# Patient Record
Sex: FEMALE | Race: BLACK OR AFRICAN AMERICAN | NOT HISPANIC OR LATINO | Employment: FULL TIME | URBAN - METROPOLITAN AREA
[De-identification: names, ages, dates, MRNs, and addresses within clinical notes are randomized per-mention and may not be internally consistent; named-entity substitution may affect disease eponyms.]

---

## 2020-08-12 ENCOUNTER — HOSPITAL ENCOUNTER (EMERGENCY)
Facility: MEDICAL CENTER | Age: 40
End: 2020-08-12
Attending: EMERGENCY MEDICINE
Payer: COMMERCIAL

## 2020-08-12 ENCOUNTER — APPOINTMENT (OUTPATIENT)
Dept: RADIOLOGY | Facility: MEDICAL CENTER | Age: 40
End: 2020-08-12
Attending: EMERGENCY MEDICINE
Payer: COMMERCIAL

## 2020-08-12 VITALS
HEIGHT: 64 IN | HEART RATE: 106 BPM | SYSTOLIC BLOOD PRESSURE: 117 MMHG | OXYGEN SATURATION: 94 % | DIASTOLIC BLOOD PRESSURE: 70 MMHG | RESPIRATION RATE: 16 BRPM | WEIGHT: 139.99 LBS | TEMPERATURE: 97.2 F | BODY MASS INDEX: 23.9 KG/M2

## 2020-08-12 DIAGNOSIS — R00.0 SINUS TACHYCARDIA: ICD-10-CM

## 2020-08-12 DIAGNOSIS — S82.61XA CLOSED DISPLACED FRACTURE OF LATERAL MALLEOLUS OF RIGHT FIBULA, INITIAL ENCOUNTER: ICD-10-CM

## 2020-08-12 DIAGNOSIS — S93.04XA DISLOCATION OF RIGHT ANKLE JOINT, INITIAL ENCOUNTER: ICD-10-CM

## 2020-08-12 LAB
ALBUMIN SERPL BCP-MCNC: 4.1 G/DL (ref 3.2–4.9)
ALBUMIN/GLOB SERPL: 1.4 G/DL
ALP SERPL-CCNC: 75 U/L (ref 30–99)
ALT SERPL-CCNC: 10 U/L (ref 2–50)
ANION GAP SERPL CALC-SCNC: 15 MMOL/L (ref 7–16)
AST SERPL-CCNC: 15 U/L (ref 12–45)
BASOPHILS # BLD AUTO: 0.3 % (ref 0–1.8)
BASOPHILS # BLD: 0.04 K/UL (ref 0–0.12)
BILIRUB SERPL-MCNC: 0.2 MG/DL (ref 0.1–1.5)
BUN SERPL-MCNC: 9 MG/DL (ref 8–22)
CALCIUM SERPL-MCNC: 8.4 MG/DL (ref 8.5–10.5)
CHLORIDE SERPL-SCNC: 101 MMOL/L (ref 96–112)
CO2 SERPL-SCNC: 21 MMOL/L (ref 20–33)
CREAT SERPL-MCNC: 0.62 MG/DL (ref 0.5–1.4)
EKG IMPRESSION: NORMAL
EOSINOPHIL # BLD AUTO: 0.21 K/UL (ref 0–0.51)
EOSINOPHIL NFR BLD: 1.5 % (ref 0–6.9)
ERYTHROCYTE [DISTWIDTH] IN BLOOD BY AUTOMATED COUNT: 41.1 FL (ref 35.9–50)
GLOBULIN SER CALC-MCNC: 3 G/DL (ref 1.9–3.5)
GLUCOSE SERPL-MCNC: 147 MG/DL (ref 65–99)
HCT VFR BLD AUTO: 37.8 % (ref 37–47)
HGB BLD-MCNC: 12.1 G/DL (ref 12–16)
IMM GRANULOCYTES # BLD AUTO: 0.05 K/UL (ref 0–0.11)
IMM GRANULOCYTES NFR BLD AUTO: 0.4 % (ref 0–0.9)
LYMPHOCYTES # BLD AUTO: 2.17 K/UL (ref 1–4.8)
LYMPHOCYTES NFR BLD: 15.7 % (ref 22–41)
MCH RBC QN AUTO: 28.6 PG (ref 27–33)
MCHC RBC AUTO-ENTMCNC: 32 G/DL (ref 33.6–35)
MCV RBC AUTO: 89.4 FL (ref 81.4–97.8)
MONOCYTES # BLD AUTO: 1.02 K/UL (ref 0–0.85)
MONOCYTES NFR BLD AUTO: 7.4 % (ref 0–13.4)
NEUTROPHILS # BLD AUTO: 10.3 K/UL (ref 2–7.15)
NEUTROPHILS NFR BLD: 74.7 % (ref 44–72)
NRBC # BLD AUTO: 0 K/UL
NRBC BLD-RTO: 0 /100 WBC
PLATELET # BLD AUTO: 244 K/UL (ref 164–446)
PMV BLD AUTO: 10.9 FL (ref 9–12.9)
POTASSIUM SERPL-SCNC: 3.2 MMOL/L (ref 3.6–5.5)
PROT SERPL-MCNC: 7.1 G/DL (ref 6–8.2)
RBC # BLD AUTO: 4.23 M/UL (ref 4.2–5.4)
SODIUM SERPL-SCNC: 137 MMOL/L (ref 135–145)
TROPONIN T SERPL-MCNC: <6 NG/L (ref 6–19)
WBC # BLD AUTO: 13.8 K/UL (ref 4.8–10.8)

## 2020-08-12 PROCEDURE — 73600 X-RAY EXAM OF ANKLE: CPT | Mod: RT

## 2020-08-12 PROCEDURE — 27840 TREAT ANKLE DISLOCATION: CPT

## 2020-08-12 PROCEDURE — 700101 HCHG RX REV CODE 250: Performed by: EMERGENCY MEDICINE

## 2020-08-12 PROCEDURE — 80053 COMPREHEN METABOLIC PANEL: CPT

## 2020-08-12 PROCEDURE — 71045 X-RAY EXAM CHEST 1 VIEW: CPT

## 2020-08-12 PROCEDURE — 96374 THER/PROPH/DIAG INJ IV PUSH: CPT

## 2020-08-12 PROCEDURE — 302875 HCHG BANDAGE ACE 4 OR 6""

## 2020-08-12 PROCEDURE — 99285 EMERGENCY DEPT VISIT HI MDM: CPT

## 2020-08-12 PROCEDURE — 93005 ELECTROCARDIOGRAM TRACING: CPT | Performed by: EMERGENCY MEDICINE

## 2020-08-12 PROCEDURE — 85025 COMPLETE CBC W/AUTO DIFF WBC: CPT

## 2020-08-12 PROCEDURE — 94760 N-INVAS EAR/PLS OXIMETRY 1: CPT

## 2020-08-12 PROCEDURE — 96375 TX/PRO/DX INJ NEW DRUG ADDON: CPT

## 2020-08-12 PROCEDURE — 700111 HCHG RX REV CODE 636 W/ 250 OVERRIDE (IP): Performed by: EMERGENCY MEDICINE

## 2020-08-12 PROCEDURE — 73610 X-RAY EXAM OF ANKLE: CPT | Mod: RT

## 2020-08-12 PROCEDURE — 84484 ASSAY OF TROPONIN QUANT: CPT

## 2020-08-12 PROCEDURE — 29515 APPLICATION SHORT LEG SPLINT: CPT

## 2020-08-12 PROCEDURE — 700105 HCHG RX REV CODE 258: Performed by: EMERGENCY MEDICINE

## 2020-08-12 PROCEDURE — 302874 HCHG BANDAGE ACE 2 OR 3""

## 2020-08-12 PROCEDURE — 94770 HCHG CO2 EXPIRED GAS DETERMINATION: CPT

## 2020-08-12 RX ORDER — HYDROCODONE BITARTRATE AND ACETAMINOPHEN 5; 325 MG/1; MG/1
1 TABLET ORAL EVERY 4 HOURS PRN
Qty: 20 TAB | Refills: 0 | Status: SHIPPED | OUTPATIENT
Start: 2020-08-12 | End: 2020-08-17

## 2020-08-12 RX ORDER — KETAMINE HYDROCHLORIDE 50 MG/ML
1 INJECTION, SOLUTION INTRAMUSCULAR; INTRAVENOUS ONCE
Status: COMPLETED | OUTPATIENT
Start: 2020-08-12 | End: 2020-08-12

## 2020-08-12 RX ORDER — SODIUM CHLORIDE 9 MG/ML
500 INJECTION, SOLUTION INTRAVENOUS
Status: DISCONTINUED | OUTPATIENT
Start: 2020-08-12 | End: 2020-08-13 | Stop reason: HOSPADM

## 2020-08-12 RX ORDER — HYDROMORPHONE HYDROCHLORIDE 1 MG/ML
1 INJECTION, SOLUTION INTRAMUSCULAR; INTRAVENOUS; SUBCUTANEOUS ONCE
Status: COMPLETED | OUTPATIENT
Start: 2020-08-12 | End: 2020-08-12

## 2020-08-12 RX ORDER — ONDANSETRON 2 MG/ML
4 INJECTION INTRAMUSCULAR; INTRAVENOUS ONCE
Status: COMPLETED | OUTPATIENT
Start: 2020-08-12 | End: 2020-08-12

## 2020-08-12 RX ADMIN — KETAMINE HYDROCHLORIDE 25 MG: 10 INJECTION, SOLUTION INTRAMUSCULAR; INTRAVENOUS at 21:30

## 2020-08-12 RX ADMIN — HYDROMORPHONE HYDROCHLORIDE 1 MG: 1 INJECTION, SOLUTION INTRAMUSCULAR; INTRAVENOUS; SUBCUTANEOUS at 20:45

## 2020-08-12 RX ADMIN — SODIUM CHLORIDE 500 ML: 9 INJECTION, SOLUTION INTRAVENOUS at 21:43

## 2020-08-12 RX ADMIN — KETAMINE HYDROCHLORIDE 63.5 MG: 50 INJECTION INTRAMUSCULAR; INTRAVENOUS at 21:24

## 2020-08-12 RX ADMIN — ONDANSETRON 4 MG: 2 INJECTION INTRAMUSCULAR; INTRAVENOUS at 20:45

## 2020-08-13 NOTE — DISCHARGE INSTRUCTIONS
Use the crutches, do not put any weight on the splint or the ankle.  You may follow-up with Dr. De Paz here but if your member of [facility] please call them tomorrow to make arrangements for orthopedic follow-up.

## 2020-08-13 NOTE — ED PROVIDER NOTES
"ED Provider  Scribed for Tao George D.O. by Dagoberto Ramírez. 8/12/2020  8:16 PM    Means of arrival: EMS   History obtained from: patient  History limited by: none    CHIEF COMPLAINT  Chief Complaint   Patient presents with   • Ankle Pain     Right       PPE Note: I personally donned full PPE for all patient encounters during this visit, including being clean-shaven with an N95 respirator mask, gloves, and eye protection. Scribe remained outside the patient's room and did not have any contact with the patient for the duration of patient encounter.       HPI  Carlie Canas is a 40 y.o. female via EMS who presents with complaints of right ankle pain and deformity that acute onset shortly prior to arrival after she fell while she was roller skating. She reports that her pain is currently an 8/10. She states that her pain improved after being given Fentanyl 150 mcg en route by EMS. She denies any recent fever, chills, vomiting, or diarrhea.     REVIEW OF SYSTEMS  See HPI for further details. All other systems are negative.     PAST MEDICAL HISTORY  None noted when reviewed.     SOCIAL HISTORY  Social History     Tobacco Use   • Smoking status: None noted.   Substance and Sexual Activity   • Alcohol use: None noted.   • Drug use: None noted.       SURGICAL HISTORY  patient denies any surgical history    CURRENT MEDICATIONS  No current outpatient medications     ALLERGIES  Allergies   Allergen Reactions   • Flagyl [Kdc:Metronidazole+Tartrazine]    • Sulfa Drugs        PHYSICAL EXAM  VITAL SIGNS: /101   Pulse (!) 128   Temp 36.4 °C (97.5 °F) (Oral)   Resp 20   Ht 1.626 m (5' 4\")   Wt 63.5 kg (140 lb)   SpO2 99%   BMI 24.03 kg/m²   Constitutional: Alert in no apparent distress.  HENT: No signs of trauma, mucous membranes are moist  Eyes: Conjunctiva normal, Non-icteric.   Neck: Normal range of motion, No tenderness, Supple.  Lymphatic: No lymphadenopathy noted.   Cardiovascular: Tachycardic rate and regular " rhythm, no murmurs.   Thorax & Lungs: Normal breath sounds, No respiratory distress, No wheezing, No chest tenderness.   Abdomen: Bowel sounds normal, Soft, No tenderness, No masses, No pulsatile masses. No peritoneal signs.  Skin: Warm, Dry, normal color.   Back: No bony tenderness, No CVA tenderness.   Extremities: Right ankle has obvious deformity and external rotation. Pedal pulse is present. Distal sensation is intact.   Neurologic: Alert and oriented x4, Normal motor function, Normal sensory function, No focal deficits noted.   Psychiatric: Affect normal, Judgment normal, Mood normal.       DIAGNOSTIC STUDIES / PROCEDURES    RADIOLOGY  DX-ANKLE 2- VIEWS RIGHT   Final Result      1.  Improved alignment of the right tibiotalar joint status post reduction. Persistent medial displacement of the tibia relative to the talus.   2.  Distal fibular fracture is redemonstrated.   3.  Posterior malleolar fracture is now evident upon reduction.      DX-CHEST-PORTABLE (1 VIEW)   Final Result      No acute cardiopulmonary disease evident.      DX-ANKLE 3+ VIEWS RIGHT   Final Result      1.  Anteromedial subluxation of the tibiotalar joint with fracture of the medial malleolus resulting in disruption of the ankle mortise and syndesmosis.   2.  Diffuse soft tissue swelling.        The radiologist's interpretations of all radiological studies have been reviewed by me.    Films have been independently by me      Conscious Sedation Procedure Note    Indication: fracture dislocation    Consent: I have discussed with the patient and/or the patient representative the indication, alternatives, and the possible risks and/or complications of the planned procedure and the anesthesia methods. The patient and/or patient representative appear to understand and agree to proceed.    Physician Involvement: The attending physician was present and supervising this procedure.    Pre-Sedation Documentation and Exam: I have personally completed a  history, physical exam & review of systems for this patient (see notes).  Vital signs have been reviewed (see flow sheet for vitals).    Airway Assessment: Mallampati Class II - (soft palate, fauces & uvula are visible)    Prior History of Anesthesia Complications: none    ASA Classification: Class 1 - A normal healthy patient    Sedation/ Anesthesia Plan: intravenous sedation    Medications Used: ketamine intravenously    Monitoring and Safety: The patient was placed on a cardiac monitor and vital signs, pulse oximetry and level of consciousness were continuously evaluated throughout the procedure. The patient was closely monitored until recovery from the medications was complete and the patient had returned to baseline status. Respiratory therapy was on standby at all times during the procedure.    Post-Sedation Vital Signs: Vital signs were reviewed and were stable after the procedure (see flow sheet for vitals)            Intraservice Time: Greater than 10 minutes    Post-Sedation Exam: Lungs: clear to auscultation bilaterally without crackles or wheezing and Cardiovascular: regular rate and rhythm, no murmurs rubs or gallops           Complications: none    I provided both the sedation and procedure, a nurse was present at the bedside for the entire procedure.     Joint Reduction Procedure Note    Indication: Joint dislocation and fracture    Consent: The patient was counseled regarding the procedure, it's indications, risks, potential complications and alternatives and any questions were answered. Consent was obtained.    Procedure: The pre-reduction exam showed distal perfusion & neurologic function to be normal. The patient was placed in the appropriate position. Anesthesia/pain control was obtained using conscious sedation with ketamine intravenously. Reduction of the right ankle was performed by direct traction. Post reduction films were obtained and revealed satisfactory reduction. A post-reduction exam  revealed distal perfusion & neurologic function to be normal. The affected area was immobilized with a three-way splint.    The patient tolerated the procedure well.    Complications: None      COURSE  Pertinent Labs & Imaging studies reviewed. (See chart for details)    8:16 PM - Patient seen and examined at bedside. Discussed plan of care. The patient will be medicated with Zofran 4 mg and Dilaudid 1 mg. Ordered for DX-ankle right to evaluate her symptoms.     8:58 PM - Reviewed the patient's ankle films and interpretation as noted above. Paged orthopedics.    9:09 PM I discussed the patient's case and the above findings with Dr. De Paz (Orthopedics) who advised completing a fracture reduction with conscious sedation and following up outpatient in the office. I reevaluated the patient at bedside, and I updated her on the radiology findings and the plan of care. Discussed the risks and benefits of these procedures and the use of Ketamine for sedation, and she verbalizes understanding and agreement.      9:22 PM - Time out called for conscious sedation and fracture reduction procedures.    9:34 PM Fracture reduction completed via conscious sedation. Patient tolerated the procedures well. See above for details.     9:45 PM - Just prior to receiving sedation, her heart rate went to 145 BPM and consistently stayed there. Cardiac work-up is being initiated. IV fluids are being intiiated. Heart rate is improving to 133 BPM at this time.     10:00 PM - Patient was reevaluated at bedside. She is awake and talking at this time, although she feels slightly groggy. Patient reports that her ankle pain is improved. After receiving IV fluids, her heart rate has improved down into the 110's.    10:55 PM - Patient was reevaluated again. Her last measured heart rate was 106 BPM. We discussed the plan for discharge and follow-up with orthopedics. Patient will be provided with crutches as well as a a prescription for Norco. Patient  verbalizes understanding and agreement to this plan of care.      MEDICAL DECISION MAKING  This is a 40 y.o. female who presents with a fracture dislocation of the right lower extremity with obvious deformity.  She had good neurovascular on presentation.  X-rays were done and showed a fracture with dislocation,.  Patient been treated with IV pain medication and this did improve her pain.  I did speak with her concerning the findings on the x-ray and the need for closed reduction with sedation.  The patient does improved.    Sedation protocols were monitored, and after ketamine and good anesthesia traction countertraction was placed on this ankle and now palpable reduction was complete.  Three-way splint was applied by me and technicians.  And post x-ray appeared normal.    On arrival the patient had mild tachycardia assumed to be related to her pain and stress.  After pain medication sedation she still remained tachycardic in the 145.  EKG showed how this to be sinus tachycardia.  She was given 2 L of IV fluid and this did resolve the tachycardia.  Her lab test shows no other reason for her tachycardia.  With this she is stable for discharge home.    I spoke with our orthopedist here, and he will see her on Friday however the patient does have [facility] and [place] and she will call [facility] tomorrow morning to find the best way to get evaluation through their plan.    I reviewed prescription monitoring program for patient's narcotic use before prescribing a scheduled drug.The patient will not drink alcohol nor drive with prescribed medications. The patient will return for new or worsening symptoms and is stable at the time of discharge.    The patient is referred to a primary physician for blood pressure management, diabetic screening, and for all other preventative health concerns.    In prescribing controlled substances to this patient, I certify that I have obtained and reviewed the medical history of  Carlie Canas. I have also made a good isaac effort to obtain applicable records from other providers who have treated the patient and no other records are available at this time.     I have conducted a physical exam and documented it. I have reviewed Ms. Canas’s prescription history as maintained by the [place] Prescription Monitoring Program.     I have assessed the patient’s risk for abuse, dependency, and addiction using the validated Opioid Risk Tool available at https://www.mdcalc.com/cfdrfx-qjqg-ofbf-ort-narcotic-abuse.     Given the above, I believe the benefits of controlled substance therapy outweigh the risks. The reasons for prescribing controlled substances include non-narcotic, oral analgesic alternatives have been inadequate for pain control. Accordingly, I have discussed the risk and benefits, treatment plan, and alternative therapies with the patient.        DISPOSITION:  Patient will be discharged home in stable condition.    FOLLOW UP:    Call [facility] tomorrow morning to make arrangements for orthopedic follow-up        Glen De Paz M.D.  [address] [facility] [address]  [phone]    In 2 days        OUTPATIENT MEDICATIONS:  Discharge Medication List as of 8/12/2020 10:59 PM      START taking these medications    Details   HYDROcodone-acetaminophen (NORCO) 5-325 MG Tab per tablet Take 1 Tab by mouth every four hours as needed for up to 5 days., Disp-20 Tab,R-0, Print Rx Paper              FINAL IMPRESSION  1. Closed displaced fracture of lateral malleolus of right fibula, initial encounter    2. Dislocation of right ankle joint, initial encounter    3. Sinus tachycardia    4. Conscious sedation procedure   5. Joint reduction procedure         Dagoberto DAVIES), am scribing for, and in the presence of, Tao George D.O..    Electronically signed by: Dagoberto Fuller), 8/12/2020    Tao DAVIES D.O. personally performed the services described in this  documentation, as scribed by Dagoberto Ramírez in my presence, and it is both accurate and complete.    The note accurately reflects work and decisions made by me.  Tao George D.O.  8/13/2020  12:01 AM

## 2020-08-13 NOTE — ED TRIAGE NOTES
BIB [facility], pt fell while rolling skating, obvious deformity  to R ankle, denies LOC, no blood thinners, CMS intact, Fentanyl 150mg given in route